# Patient Record
Sex: FEMALE | Race: WHITE
[De-identification: names, ages, dates, MRNs, and addresses within clinical notes are randomized per-mention and may not be internally consistent; named-entity substitution may affect disease eponyms.]

---

## 2022-07-18 ENCOUNTER — HOSPITAL ENCOUNTER (EMERGENCY)
Dept: HOSPITAL 97 - ER | Age: 37
Discharge: HOME | End: 2022-07-18
Payer: COMMERCIAL

## 2022-07-18 VITALS — OXYGEN SATURATION: 97 %

## 2022-07-18 VITALS — DIASTOLIC BLOOD PRESSURE: 82 MMHG | SYSTOLIC BLOOD PRESSURE: 124 MMHG

## 2022-07-18 DIAGNOSIS — R10.11: Primary | ICD-10-CM

## 2022-07-18 DIAGNOSIS — Z91.040: ICD-10-CM

## 2022-07-18 DIAGNOSIS — Z88.8: ICD-10-CM

## 2022-07-18 DIAGNOSIS — Z88.5: ICD-10-CM

## 2022-07-18 DIAGNOSIS — Z91.048: ICD-10-CM

## 2022-07-18 LAB
ALBUMIN SERPL BCP-MCNC: 3.6 G/DL (ref 3.4–5)
ALP SERPL-CCNC: 84 U/L (ref 45–117)
ALT SERPL W P-5'-P-CCNC: 21 U/L (ref 12–78)
AST SERPL W P-5'-P-CCNC: 16 U/L (ref 15–37)
BUN BLD-MCNC: 10 MG/DL (ref 7–18)
GLUCOSE SERPLBLD-MCNC: 86 MG/DL (ref 74–106)
HCT VFR BLD CALC: 45.7 % (ref 36–45)
LIPASE SERPL-CCNC: 77 U/L (ref 73–393)
LYMPHOCYTES # SPEC AUTO: 1.4 K/UL (ref 0.7–4.9)
MCV RBC: 89.8 FL (ref 80–100)
PMV BLD: 9.4 FL (ref 7.6–11.3)
POTASSIUM SERPL-SCNC: 3.7 MMOL/L (ref 3.5–5.1)
RBC # BLD: 5.09 M/UL (ref 3.86–4.86)

## 2022-07-18 PROCEDURE — 96374 THER/PROPH/DIAG INJ IV PUSH: CPT

## 2022-07-18 PROCEDURE — 76705 ECHO EXAM OF ABDOMEN: CPT

## 2022-07-18 PROCEDURE — 81015 MICROSCOPIC EXAM OF URINE: CPT

## 2022-07-18 PROCEDURE — 99284 EMERGENCY DEPT VISIT MOD MDM: CPT

## 2022-07-18 PROCEDURE — 85025 COMPLETE CBC W/AUTO DIFF WBC: CPT

## 2022-07-18 PROCEDURE — 80053 COMPREHEN METABOLIC PANEL: CPT

## 2022-07-18 PROCEDURE — 83690 ASSAY OF LIPASE: CPT

## 2022-07-18 PROCEDURE — 74177 CT ABD & PELVIS W/CONTRAST: CPT

## 2022-07-18 PROCEDURE — 81003 URINALYSIS AUTO W/O SCOPE: CPT

## 2022-07-18 PROCEDURE — 36415 COLL VENOUS BLD VENIPUNCTURE: CPT

## 2022-07-18 NOTE — RAD REPORT
EXAM DESCRIPTION:  US - Abdomen Exam Limited - 7/18/2022 12:25 pm

 

CLINICAL HISTORY:  Abdominal pain.

 

COMPARISON:  None.

 

FINDINGS:   The gallbladder wall is not thickened.

 

Two echogenic structures measuring about 3 millimeters do not exhibit posterior shadowing.

 

The biliary tree is normal caliber.

 

IMPRESSION:  Two small echogenic structures within the gallbladder may represent polyps or stones whi
ch did not shadow secondary to a combination of their small size and technical factors. Follow-up ult
rasound in 1 year recommended for re-evaluation

## 2022-07-18 NOTE — EDPHYS
Physician Documentation                                                                           

 [facility]                                                                 

Name: Sierra Mancia                                                                                  

Age: 36 yrs                                                                                       

Sex: Female                                                                                       

: 1985                                                                                   

MRN: [id number]                                                                                   

Arrival Date: 2022                                                                          

Time: 11:49                                                                                       

Account#: [id number]                                                                            

Bed 5                                                                                             

Private MD:                                                                                       

ED Physician Stalin Mancia                                                                         

HPI:                                                                                              

                                                                                             

12:08 This 36 yrs old Female presents to ER via Un[facility]igned with complaints of Abdominal Pain. ms3 

12:08 The patient presents with abdominal pain in the right upper quadrant. Onset: The        ms3 

      symptoms/episode began/occurred acutely, 3 day(s) ago. The symptoms do not radiate.         

      Associated signs and symptoms: none. The symptoms are described as sharp. Modifying         

      factors: The symptoms are alleviated by nothing, the symptoms are aggravated by             

      nothing. Severity of pain: At its worst the pain was moderate severe in the emergency       

      department the pain has improved.                                                           

                                                                                                  

OB/GYN:                                                                                           

12:36 LMP 2022                                                                           vg1 

                                                                                                  

Historical:                                                                                       

- Allergies:                                                                                      

12:36 Codeine;                                                                                vg1 

12:36 Hydrocodone-Acetaminophen;                                                              vg1 

12:36 Latex, Natural Rubber;                                                                  vg1 

12:36 Phenergan;                                                                              vg1 

- Home Meds:                                                                                      

12:36 None [Active];                                                                          vg1 

- PMHx:                                                                                           

12:36 None;                                                                                   vg1 

- PSHx:                                                                                           

12:36 None;                                                                                   vg1 

                                                                                                  

- Immunization history:: Client reports having NOT received the Covid vaccine.                    

- Social history:: Smoking status: Patient denies any tobacco usage or history of.                

                                                                                                  

                                                                                                  

ROS:                                                                                              

12:08 Constitutional: Negative for fever, and chills. Neck: Negative for injury, pain, and    ms3 

      swelling, Cardiovascular: Negative for chest pain, and palpitations. Respiratory:           

      Negative for shortness of breath, cough, wheezing, and pleuritic chest pain,                

      Abdomen/GI: Negative for abdominal pain, nausea, vomiting, diarrhea, and constipation,      

      Back: Negative for injury and pain, MS/Extremity: Negative for injury and deformity,        

      Skin: Negative for injury, rash, and discoloration, Neuro: Negative for headache,           

      weakness, numbness, tingling. Psych: Negative for depression, anxiety, suicide              

      ideation, homicidal ideation, and hallucinations.                                           

                                                                                                  

Exam:                                                                                             

12:13 Constitutional:  This is a well developed, well nourished patient who is awake, alert,  ms3 

      and in no acute distress. Head/Face:  Normocephalic, atraumatic. ENT:  Nares patent. No     

      nasal discharge, no septal abnormalities noted.  Tympanic membranes are normal and          

      external auditory canals are clear.  Oropharynx with no redness, swelling, or masses,       

      exudates, or evidence of obstruction, uvula midline.  Mucous membranes moist. Neck:         

      Trachea midline, no cervical lymphadenopathy.  Supple, full range of motion without         

      nuchal rigidity, or vertebral point tenderness.  No Meningismus. Chest/axilla:  Normal      

      chest wall appearance and motion.  Nontender with no deformity.   Cardiovascular:           

      Regular rate and rhythm with a normal S1 and S2.  No gallops, murmurs, or rubs.  Normal     

      PMI, no JVD.  No pulse deficits. Respiratory:  Lungs have equal breath sounds               

      bilaterally, clear to auscultation and percussion.  No rales, rhonchi or wheezes noted.     

       No increased work of breathing, no retractions or nasal flaring.                           

12:13 Psych:  Awake, alert, with orientation to person, place and time.  Behavior, mood, and      

      affect are within normal limits.                                                            

12:13 Abdomen/GI: Inspection: abdomen appears normal, Bowel sounds: normal, Palpation:            

      moderate abdominal tenderness, in the right upper quadrant.                                 

                                                                                                  

Vital Signs:                                                                                      

12:34  / 85; Pulse 100; Resp 16; Pulse Ox 98% on R/A; Weight 88 kg; Height 5 ft. 7 in.  vg1 

      (170.18 cm); Pain 0/10;                                                                     

15:00  / 83; Pulse 81; Resp 20; Pulse Ox 97% ;                                          bm7 

17:00  / 82; Pulse 79; Resp 16; Pulse Ox 97% ;                                          bp  

12:34 Body Mass Index 30.38 (88.00 kg, 170.18 cm)                                             vg1 

                                                                                                  

MDM:                                                                                              

12:13 Differential diagnosis: cholecystitis, Cholelithiasis, non-specific abd pain,           ms3 

      pancreatitis.                                                                               

14:09 Patient medically screened.                                                             ms3 

17:34 Data reviewed: vital signs, nurses notes, lab test result(s), radiologic studies, and   ms3 

      as a result, I will discharge patient. Counseling: I had a detailed discussion with the     

      patient and/or guardian regarding: the historical points, exam findings, and any            

      diagnostic results supporting the discharge/admit diagnosis, lab results, radiology         

      results, the need for outpatient follow up, to return to the emergency department if        

      symptoms worsen or persist or if there are any questions or concerns that arise at          

      home. ED course: Discussed labs, US, CT, physical exam findings with patient. Patient       

      to follow-up with Dr Armando in 2 to 3 days. Patient understands and agrees with plan.        

      All questions were answered. Return precautions discussed include worsening symptoms,       

      or any other concerns. On reevaluation patient is alert and oriented x4, in no apparent     

      distress, nontoxic-appearing, speaking full sentences, ambulatory in emergency              

      department..                                                                                

                                                                                                  

                                                                                             

12:08 Order name: CBC with Diff; Complete Time: 14:09                                         ms3 

                                                                                             

12:08 Order name: CMP; Complete Time: 14:09                                                   ms3 

                                                                                             

12:08 Order name: Lipase; Complete Time: 14:09                                                ms3 

                                                                                             

12:08 Order name: Urine Microscopic Only; Complete Time: 16:25                                ms3 

                                                                                             

15:40 Order name: Urine Dipstick-Ancillary; Complete Time: 16:09                              EDMS

                                                                                             

12:08 Order name: Abdomen Limited US; Complete Time: 14:09                                    ms3 

                                                                                             

12:08 Order name: IV Saline Lock; Complete Time: 12:35                                        ms3 

                                                                                             

12:08 Order name: Labs collected and sent; Complete Time: 12:35                               ms3 

                                                                                             

12:08 Order name: Urine Dipstick-Ancillary (obtain specimen); Complete Time: 15:50            ms3 

                                                                                             

12:08 Order name: Urine Pregnancy Test (obtain specimen); Complete Time: 15:51                ms3 

                                                                                             

14:14 Order name: CT Abd/Pelvis - IV Contrast Only; Complete Time: 17:23                      ms3 

                                                                                                  

Administered Medications:                                                                         

12:35 Drug: Pepcid (famotidine) 20 mg Route: IVP; Site: right antecubital;                    eh3 

13:46 Follow up: Response: No adverse reaction                                                ld1 

                                                                                                  

                                                                                                  

Disposition Summary:                                                                              

22 17:28                                                                                    

Discharge Ordered                                                                                 

      Location: Home                                                                          ms3 

      Condition: Stable                                                                       ms3 

      Diagnosis                                                                                   

        - Upper abdominal pain, unspecified                                                   ms3 

      Followup:                                                                               ms3 

        - With: Bernard Wallace MD                                                                

        - When: 2 - 3 days                                                                         

        - Reason: Recheck today's complaints                                                       

      Discharge Instructions:                                                                     

        - Discharge Summary Sheet                                                             ms3 

        - Abdominal Pain, Adult                                                               ms3 

      Forms:                                                                                      

        - Medication Reconciliation Form                                                      ms3 

        - Thank You Letter                                                                    ms3 

        - Antibiotic Education                                                                ms3 

        - Prescription Opioid Use                                                             ms3 

      Prescriptions:                                                                              

        - BENTYL 20 mg                                                                             

            - take 1 tablet by ORAL route every 6-8 hours; 20 tablet; Refills: 0, Product     ms3 

      Selection Permitted                                                                         

Signatures:                                                                                       

Dispatcher [employer]                           Apple Campos, RN                    RN   vg1                                                  

Stalin Mancia DO DO   ms3                                                  

Luciana Selby                                   eh3                                                  

Kelley Turciso RN   ld1                                                  

                                                                                                  

**************************************************************************************************

## 2022-07-18 NOTE — RAD REPORT
EXAM DESCRIPTION:  CT - Abdomen   Pelvis W Contrast - 7/18/2022 5:00 pm

 

CLINICAL HISTORY:  Abdominal pain

 

COMPARISON:  none.

 

TECHNIQUE:  Computed axial tomography of the abdomen pelvis was obtained. 100 cc Isovue-300 was admin
istered intravenously. Oral contrast was not requested which limits evaluation of bowel and appendix

 

All CT scans are performed using dose optimization technique as appropriate and may include automated
 exposure control or mA/KV adjustment according to patient size.

 

FINDINGS:  The liver, spleen, pancreas, adrenal and right kidney appear unremarkable. 2 millimeter ca
lculus left kidney. No hydronephrosis

 

There is no evidence of diverticulitis.

 

Mild wall thickening of several loops of ileum. Stranding within the adjacent mesentery. Small amount
 of pelvic ascites.

 

ICD within the uterus. Small umbilical hernia

 

IMPRESSION:  Mild wall thickening of several loops of ileum may indicate inflammation

## 2022-07-18 NOTE — ER
Nurse's Notes                                                                                     

 [facility]                                                                 

Name: Sierra Mancia                                                                                  

Age: 36 yrs                                                                                       

Sex: Female                                                                                       

: 1985                                                                                   

MRN: [id number]                                                                                   

Arrival Date: 2022                                                                          

Time: 11:49                                                                                       

Account#: [id number]                                                                            

Bed 5                                                                                             

Private MD:                                                                                       

Diagnosis: Upper abdominal pain, unspecified                                                      

                                                                                                  

Presentation:                                                                                     

                                                                                             

12:34 Chief complaint: Patient states: RUQ pain x 3 days that is cramping/shooting pain. Also vg1 

      stated diarrhea on . Coronavirus screen: Vaccine status: Patient reports being       

      unvaccinated. Client denies travel out of the [place] in the last 14 days. Ebola Screen:       

      Patient denies exposure to infectious person. Patient denies travel to an                   

      Ebola-affected area in the 21 days before illness onset. Initial Sepsis Screen: Does        

      the patient meet any 2 criteria? No. Patient's initial sepsis screen is negative. Does      

      the patient have a suspected source of infection? No. Patient's initial sepsis screen       

      is negative. Risk Assessment: Do you want to hurt yourself or someone else? Patient         

      reports no desire to harm self or others. Onset of symptoms was July 15, 2022.              

12:34 Method Of Arrival: Ambulatory                                                           vg1 

12:34 Acuity: BRENDA 3                                                                           vg1 

                                                                                                  

Triage Assessment:                                                                                

12:36 General: Appears in no apparent distress. uncomfortable, Behavior is calm, cooperative. vg1 

      Pain: Complains of pain in right upper quadrant Pain currently is 0 out of 10 on a pain     

      scale. at worst was 10 out of 10 on a pain scale. Quality of pain is described as           

      crampy, stabbing. GI: Abdomen is flat.                                                      

                                                                                                  

OB/GYN:                                                                                           

12:36 LMP 2022                                                                           vg1 

                                                                                                  

Historical:                                                                                       

- Allergies:                                                                                      

12:36 Codeine;                                                                                vg1 

12:36 Hydrocodone-Acetaminophen;                                                              vg1 

12:36 Latex, Natural Rubber;                                                                  vg1 

12:36 Phenergan;                                                                              vg1 

- Home Meds:                                                                                      

12:36 None [Active];                                                                          vg1 

- PMHx:                                                                                           

12:36 None;                                                                                   vg1 

- PSHx:                                                                                           

12:36 None;                                                                                   vg1 

                                                                                                  

- Immunization history:: Client reports having NOT received the Covid vaccine.                    

- Social history:: Smoking status: Patient denies any tobacco usage or history of.                

                                                                                                  

                                                                                                  

Screenin:00 Abuse screen: Denies threats or abuse. Denies injuries from another. Nutritional        bm7 

      screening: No deficits noted. Tuberculosis screening: No symptoms or risk factors           

      identified. Fall Risk None identified.                                                      

                                                                                                  

Assessment:                                                                                       

14:00 General: SEE TRIAGE NOTE.                                                               bm7 

16:00 Reassessment: No changes from previously documented assessment. Patient and/or family   bp  

      updated on plan of care and expected duration. Pain level reassessed.                       

17:50 Reassessment: PT D/C HOME AMBULATORY, DX WITH NONSPECIFIC ABDOMINAL PAIN.               bp  

17:52 GI: Bowel sounds present X 4 quads. Abd is soft X 4 quads.                              bp  

                                                                                                  

Vital Signs:                                                                                      

12:34  / 85; Pulse 100; Resp 16; Pulse Ox 98% on R/A; Weight 88 kg; Height 5 ft. 7 in.  vg1 

      (170.18 cm); Pain 0/10;                                                                     

15:00  / 83; Pulse 81; Resp 20; Pulse Ox 97% ;                                          bm7 

17:00  / 82; Pulse 79; Resp 16; Pulse Ox 97% ;                                          bp  

12:34 Body Mass Index 30.38 (88.00 kg, 170.18 cm)                                             vg1 

                                                                                                  

ED Course:                                                                                        

11:49 Patient arrived in ED.                                                                  as  

11:54 Stalin Mancia DO is Attending Physician.                                                ms3 

12:27 Abdomen Limited US In Process Unspecified.                                              EDMS

12:36 Triage completed.                                                                       vg1 

12:36 Arm band placed on.                                                                     vg1 

12:46 Inserted saline lock: 20 gauge in right antecubital area, using aseptic technique.      kc6 

      Blood collected.                                                                            

12:47 CBC with Diff Sent.                                                                     kc6 

12:47 CMP Sent.                                                                               kc6 

12:47 Lipase Sent.                                                                            kc6 

14:00 Patient has correct armband on for positive identification. Bed in low position. Call   bm7 

      light in reach. Side rails up X2. Adult w/ patient.                                         

14:04 Forest Meza, RN is Primary Nurse.                                                    bp  

17:02 CT Abd/Pelvis - IV Contrast Only In Process Unspecified.                                EDMS

17:28 Bernard Wallace MD is Referral Physician.                                              ms3 

17:51 No provider procedures requiring assistance completed. IV discontinued, intact,         bp  

      bleeding controlled, No redness/swelling at site. Pressure dressing applied.                

                                                                                                  

Administered Medications:                                                                         

12:35 Drug: Pepcid (famotidine) 20 mg Route: IVP; Site: right antecubital;                    eh3 

13:46 Follow up: Response: No adverse reaction                                                ld1 

                                                                                                  

                                                                                                  

Outcome:                                                                                          

17:28 Discharge ordered by MD.                                                                ms3 

17:51 Discharged to home ambulatory.                                                          bp  

17:51 Condition: stable                                                                           

17:51 Discharge instructions given to patient, Instructed on discharge instructions, follow       

      up and referral plans. medication usage, Demonstrated understanding of instructions,        

      follow-up care, medications, Prescriptions given X 1.                                       

17:52 Patient left the ED.                                                                    bp  

                                                                                                  

Signatures:                                                                                       

Dispatcher [employer]                           EDMS                                                 

Mandy Ochoa Brian, RN                      RN   Apple Gavin RN                    RN   vg1                                                  

Stalin Mancia DO                        DO   ms3                                                  

Shamika Blanco, RN                  RN   bm7                                                  

Kelley Turcios RN                     RN   ld1                                                  

Luciana Selby                                   3                                                  

Jenny Hauser                            6                                                  

                                                                                                  

**************************************************************************************************